# Patient Record
Sex: FEMALE | Race: WHITE | NOT HISPANIC OR LATINO | Employment: STUDENT | URBAN - METROPOLITAN AREA
[De-identification: names, ages, dates, MRNs, and addresses within clinical notes are randomized per-mention and may not be internally consistent; named-entity substitution may affect disease eponyms.]

---

## 2021-10-28 ENCOUNTER — HOSPITAL ENCOUNTER (EMERGENCY)
Facility: CLINIC | Age: 21
Discharge: HOME | End: 2021-10-28
Attending: EMERGENCY MEDICINE | Admitting: EMERGENCY MEDICINE
Payer: COMMERCIAL

## 2021-10-28 VITALS
DIASTOLIC BLOOD PRESSURE: 79 MMHG | SYSTOLIC BLOOD PRESSURE: 113 MMHG | RESPIRATION RATE: 16 BRPM | TEMPERATURE: 99.6 F | OXYGEN SATURATION: 99 % | HEART RATE: 80 BPM

## 2021-10-28 DIAGNOSIS — F41.0 ANXIETY ATTACK: ICD-10-CM

## 2021-10-28 DIAGNOSIS — F41.1 GENERALIZED ANXIETY DISORDER: ICD-10-CM

## 2021-10-28 PROCEDURE — 99284 EMERGENCY DEPT VISIT MOD MDM: CPT | Performed by: EMERGENCY MEDICINE

## 2021-10-28 PROCEDURE — 99285 EMERGENCY DEPT VISIT HI MDM: CPT | Mod: 25 | Performed by: EMERGENCY MEDICINE

## 2021-10-28 PROCEDURE — 90791 PSYCH DIAGNOSTIC EVALUATION: CPT

## 2021-10-28 RX ORDER — SERTRALINE HYDROCHLORIDE 100 MG/1
100 TABLET, FILM COATED ORAL DAILY
COMMUNITY

## 2021-10-28 RX ORDER — MULTIPLE VITAMINS W/ MINERALS TAB 9MG-400MCG
1 TAB ORAL DAILY
COMMUNITY

## 2021-10-28 RX ORDER — HYDROXYZINE HYDROCHLORIDE 25 MG/1
25 TABLET, FILM COATED ORAL 3 TIMES DAILY PRN
COMMUNITY

## 2021-10-28 RX ORDER — ACETAMINOPHEN 500 MG
500-1000 TABLET ORAL EVERY 6 HOURS PRN
COMMUNITY

## 2021-10-28 ASSESSMENT — ENCOUNTER SYMPTOMS
CHILLS: 0
ARTHRALGIAS: 0
SHORTNESS OF BREATH: 0
NERVOUS/ANXIOUS: 1
COUGH: 0
LIGHT-HEADEDNESS: 0
FEVER: 0
HEADACHES: 0
EYE REDNESS: 0
COLOR CHANGE: 0
NECK STIFFNESS: 0
DIFFICULTY URINATING: 0
CONFUSION: 0
VOMITING: 0
SLEEP DISTURBANCE: 0
ABDOMINAL PAIN: 0
NAUSEA: 0

## 2021-10-28 NOTE — ED NOTES
If I am feeling unsafe or I am in a crisis, I will:   Contact my established care providers   Call the National Suicide Prevention Lifeline: 121.530.5565   Go to the nearest emergency room   Call 435        Warning signs that I or other people might notice when a crisis is developing for me:   I start to experience an increase in physical symptoms such as body tingling/numbness, fidgeting, picking my nails, cracking my knuckles, and nausea.    Things I am able to do on my own to cope or help me feel better:   Focus on school and utilize [employer]    Things that I am able to do with others to cope or help me better:   Spend time with roommates, connect with friends, reach out to family    Things I can use or do for distraction:   Work and school    People in my life that I can ask for help:   Therapist, mentor, family    Your [facility] has a mental health crisis team you can call 24/7:   [facility] [phone]

## 2021-10-28 NOTE — ED NOTES
Bed: HW01  Expected date: 10/28/21  Expected time: 3:42 PM  Means of arrival: Ambulance  Comments:  Hen 449  20 F anxiety

## 2021-10-28 NOTE — ED NOTES
I performed medical screening exam and found patient to be stable for mental health evaluation.  It appears the patient panic attack while driving to her occupational therapy appointment.  She felt lightheaded when she arrived at the appointment, hyperventilating, and had a feeling that she might lose consciousness.  She sat down and within several minutes her symptoms improved.  She has history of panic attacks.  At this time she stable for evaluation by [facility] Team.     Rachael Corrales MD  10/28/21 1615

## 2021-10-28 NOTE — ED PROVIDER NOTES
[facility] EMERGENCY DEPARTMENT ([facility])    10/28/21    Hallway 1  History     Chief Complaint   Patient presents with     Anxiety     Per pt report, experienced severe panic attack and passed out while at [employer]. Says she has not experienced one this bad before.      The history is provided by the patient.     Kourtney Santiago is a 20 year old female who presents for evaluation after panic attack.  She was driving to her occupational therapy appointment when she started to feel lightheaded, hyperventilating.  When she arrived to her appointment she had concern that she was going to lose consciousness, sat down and her symptoms did not improve with this.  She was sent to the ED for further evaluation.  Here she feels improved and notes prior history of panic attacks.    Patient was seen by [facility] .  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  Patient was having panic attack-like symptoms on arrival to the ED she was hyperventilating and she stated that her body was numb.  Patient's parents are missionaries and she has been moving around a lot the past few years and since starting school here in [place] she has been feeling lost and anxious.  She states that she misses her family and friends.  Patient does note that she really enjoys her schooling here.  She states she has some roommates that she is trying to make a connection with.  Patient has a therapist at her school and recently started seeing another therapist in an outpatient setting.  Patient states she has been focusing on CBT therapy with her new therapist.  Patient denies SI or HI and has no history of SI or HI.  Patient is taking Zoloft and hydroxyzine and states that she feels like she needs an increase in her dosage which has been previously discussed with her PCP.    Past Medical History  No past medical history on file.  No past surgical history on file.  acetaminophen (TYLENOL) 500 MG tablet  hydrOXYzine  (ATARAX) 25 MG tablet  multivitamin w/minerals (THERA-VIT-M) tablet  sertraline (ZOLOFT) 100 MG tablet      No Known Allergies  Family History  No family history on file.  Social History   Social History     Tobacco Use     Smoking status: Not on file   Substance Use Topics     Alcohol use: Not on file     Drug use: Not on file      Past medical history, past surgical history, medications, allergies, family history, and social history were reviewed with the patient. No additional pertinent items.       Review of Systems   Constitutional: Negative for chills and fever.   HENT: Negative for congestion.    Eyes: Negative for redness.   Respiratory: Negative for cough and shortness of breath.    Cardiovascular: Negative for chest pain.   Gastrointestinal: Negative for abdominal pain, nausea and vomiting.   Genitourinary: Negative for difficulty urinating.   Musculoskeletal: Negative for arthralgias and neck stiffness.   Skin: Negative for color change.   Neurological: Negative for light-headedness (Improved) and headaches.   Psychiatric/Behavioral: Negative for confusion, sleep disturbance and suicidal ideas. The patient is nervous/anxious (Improved occasional).    All other systems reviewed and are negative.    A complete review of systems was performed with pertinent positives and negatives noted in the HPI, and all other systems negative.    Physical Exam   BP: 130/85  Pulse: 99  Temp: 98.9  F (37.2  C)  Resp: 16  SpO2: 99 %  Physical Exam  Vitals and nursing note reviewed.   Constitutional:       General: She is not in acute distress.     Appearance: Normal appearance. She is not ill-appearing, toxic-appearing or diaphoretic.      Comments: Sitting in bed, awake, alert, appears in no acute distress.  She is well-groomed and appears her stated age.   HENT:      Head: Normocephalic and atraumatic.      Mouth/Throat:      Pharynx: No oropharyngeal exudate.   Eyes:      General: No scleral icterus.     Extraocular  Movements: Extraocular movements intact.      Conjunctiva/sclera: Conjunctivae normal.   Cardiovascular:      Rate and Rhythm: Normal rate.      Pulses: Normal pulses.      Heart sounds: Normal heart sounds.   Pulmonary:      Effort: Pulmonary effort is normal. No respiratory distress.      Breath sounds: Normal breath sounds.   Abdominal:      Palpations: Abdomen is soft.      Tenderness: There is no abdominal tenderness. There is no guarding or rebound.   Musculoskeletal:         General: No tenderness. Normal range of motion.      Cervical back: Normal range of motion and neck supple.      Right lower leg: No edema.      Left lower leg: No edema.   Skin:     General: Skin is warm.      Findings: No rash.   Neurological:      General: No focal deficit present.      Mental Status: She is alert and oriented to person, place, and time.      Cranial Nerves: No cranial nerve deficit.      Coordination: Coordination normal.   Psychiatric:         Mood and Affect: Mood normal.         Behavior: Behavior normal.         Thought Content: Thought content normal.         Judgment: Judgment normal.      Comments: No suicidal homicidal ideations.  No signs of active hallucinations.  Linear thought process.         ED Course      Procedures            No results found for any visits on 10/28/21.  Medications - No data to display     Assessments & Plan (with Medical Decision Making)   20-year-old woman brought in by ambulance from her therapist office due to anxiety and lightheadedness.  Patient symptoms have resolved by the time she arrived to the emergency department.  Symptoms are likely due to an anxiety attack given patient's history.  She has no risk factors for ACS or pulmonary embolus.  She was seen and evaluated by mental health  and deemed appropriate for discharge with outpatient follow-up with her primary care physician and her therapist.  She agrees with this plan.  She is safe and stable for discharge.  She  has no suicidal homicidal ideations.    I have reviewed the nursing notes. I have reviewed the findings, diagnosis, plan and need for follow up with the patient.    Discharge Medication List as of 10/28/2021  6:22 PM          Final diagnoses:   Anxiety attack   Generalized anxiety disorder       --  ILouisa, am serving as a trained medical scribe to document services personally performed by Rachael Corrales MD, MD, based on the provider's statements to me.     Savanna HAYES Nikola, MD, MD, was physically present and have reviewed and verified the accuracy of this note documented by Louisa Pickering.    Rachael Corrales MD  [facility] EMERGENCY DEPARTMENT  10/28/2021     Rachael Corrales MD  10/28/21 4610

## 2021-10-28 NOTE — DISCHARGE INSTRUCTIONS
Discharge plan:    Follow up with Primary Care Dr. Nancy Amor at [address] , [place] [address] for medication management.    Continue with outpatient mental health therapy at   [facility]: appointment scheduled for Monday November 1, 2021    [facility] on Wednesday November 3, 2021        Safety Plan:    If I am feeling unsafe or I am in a crisis, I will:   Contact my established care providers   Call the National Suicide Prevention Lifeline: [phone]   Go to the nearest emergency room   Call [phone]        Warning signs that I or other people might notice when a crisis is developing for me:   I start to experience an increase in physical symptoms such as body tingling/numbness, fidgeting, picking my nails, cracking my knuckles, and nausea.    Things I am able to do on my own to cope or help me feel better:   Focus on school and utilize NPR    Things that I am able to do with others to cope or help me better:   Spend time with roommates, connect with friends, reach out to family    Things I can use or do for distraction:   Work and school    People in my life that I can ask for help:   Therapist, mentor, family    Your [facility] has a mental health crisis team you can call 24/7:   [facility] [phone]

## 2021-10-28 NOTE — Clinical Note
Jack was seen and treated in our emergency department on 10/28/2021.  She may return to school on 10/29/2021.      If you have any questions or concerns, please don't hesitate to call.      Nicolasa Chew RN
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
## 2023-04-15 PROCEDURE — 99285 EMERGENCY DEPT VISIT HI MDM: CPT | Mod: 25 | Performed by: EMERGENCY MEDICINE

## 2023-04-15 PROCEDURE — 99284 EMERGENCY DEPT VISIT MOD MDM: CPT | Performed by: EMERGENCY MEDICINE

## 2023-04-15 PROCEDURE — 96374 THER/PROPH/DIAG INJ IV PUSH: CPT | Performed by: EMERGENCY MEDICINE

## 2023-04-16 ENCOUNTER — HOSPITAL ENCOUNTER (EMERGENCY)
Facility: CLINIC | Age: 23
Discharge: HOME | End: 2023-04-16
Attending: EMERGENCY MEDICINE | Admitting: EMERGENCY MEDICINE
Payer: COMMERCIAL

## 2023-04-16 VITALS
OXYGEN SATURATION: 100 % | DIASTOLIC BLOOD PRESSURE: 97 MMHG | BODY MASS INDEX: 21.26 KG/M2 | WEIGHT: 120 LBS | HEIGHT: 63 IN | RESPIRATION RATE: 18 BRPM | SYSTOLIC BLOOD PRESSURE: 121 MMHG | TEMPERATURE: 98.4 F | HEART RATE: 79 BPM

## 2023-04-16 DIAGNOSIS — F06.1 CATATONIC REACTION: ICD-10-CM

## 2023-04-16 LAB
ANION GAP SERPL CALCULATED.3IONS-SCNC: 15 MMOL/L (ref 7–15)
BASOPHILS # BLD AUTO: 0.1 10E3/UL (ref 0–0.2)
BASOPHILS NFR BLD AUTO: 1 %
BUN SERPL-MCNC: 7.7 MG/DL (ref 6–20)
CALCIUM SERPL-MCNC: 9.2 MG/DL (ref 8.6–10)
CHLORIDE SERPL-SCNC: 100 MMOL/L (ref 98–107)
CREAT SERPL-MCNC: 0.57 MG/DL (ref 0.51–0.95)
DEPRECATED HCO3 PLAS-SCNC: 22 MMOL/L (ref 22–29)
EOSINOPHIL # BLD AUTO: 0.2 10E3/UL (ref 0–0.7)
EOSINOPHIL NFR BLD AUTO: 2 %
ERYTHROCYTE [DISTWIDTH] IN BLOOD BY AUTOMATED COUNT: 12.1 % (ref 10–15)
GFR SERPL CREATININE-BSD FRML MDRD: >90 ML/MIN/1.73M2
GLUCOSE SERPL-MCNC: 98 MG/DL (ref 70–99)
HCG SERPL QL: NEGATIVE
HCT VFR BLD AUTO: 40.1 % (ref 35–47)
HGB BLD-MCNC: 13.1 G/DL (ref 11.7–15.7)
IMM GRANULOCYTES # BLD: 0 10E3/UL
IMM GRANULOCYTES NFR BLD: 0 %
LYMPHOCYTES # BLD AUTO: 3.2 10E3/UL (ref 0.8–5.3)
LYMPHOCYTES NFR BLD AUTO: 47 %
MAGNESIUM SERPL-MCNC: 1.9 MG/DL (ref 1.7–2.3)
MCH RBC QN AUTO: 29.3 PG (ref 26.5–33)
MCHC RBC AUTO-ENTMCNC: 32.7 G/DL (ref 31.5–36.5)
MCV RBC AUTO: 90 FL (ref 78–100)
MONOCYTES # BLD AUTO: 0.5 10E3/UL (ref 0–1.3)
MONOCYTES NFR BLD AUTO: 8 %
NEUTROPHILS # BLD AUTO: 2.9 10E3/UL (ref 1.6–8.3)
NEUTROPHILS NFR BLD AUTO: 42 %
NRBC # BLD AUTO: 0 10E3/UL
NRBC BLD AUTO-RTO: 0 /100
PHOSPHATE SERPL-MCNC: 3.9 MG/DL (ref 2.5–4.5)
PLATELET # BLD AUTO: 334 10E3/UL (ref 150–450)
POTASSIUM SERPL-SCNC: 4.5 MMOL/L (ref 3.4–5.3)
RBC # BLD AUTO: 4.47 10E6/UL (ref 3.8–5.2)
SODIUM SERPL-SCNC: 137 MMOL/L (ref 136–145)
TSH SERPL DL<=0.005 MIU/L-ACNC: 2.38 UIU/ML (ref 0.3–4.2)
WBC # BLD AUTO: 6.8 10E3/UL (ref 4–11)

## 2023-04-16 PROCEDURE — 84703 CHORIONIC GONADOTROPIN ASSAY: CPT | Performed by: EMERGENCY MEDICINE

## 2023-04-16 PROCEDURE — 84100 ASSAY OF PHOSPHORUS: CPT | Performed by: EMERGENCY MEDICINE

## 2023-04-16 PROCEDURE — 84443 ASSAY THYROID STIM HORMONE: CPT | Performed by: EMERGENCY MEDICINE

## 2023-04-16 PROCEDURE — 80048 BASIC METABOLIC PNL TOTAL CA: CPT | Performed by: EMERGENCY MEDICINE

## 2023-04-16 PROCEDURE — 83735 ASSAY OF MAGNESIUM: CPT | Performed by: EMERGENCY MEDICINE

## 2023-04-16 PROCEDURE — 36415 COLL VENOUS BLD VENIPUNCTURE: CPT | Performed by: EMERGENCY MEDICINE

## 2023-04-16 PROCEDURE — 85025 COMPLETE CBC W/AUTO DIFF WBC: CPT | Performed by: EMERGENCY MEDICINE

## 2023-04-16 PROCEDURE — 250N000011 HC RX IP 250 OP 636: Performed by: EMERGENCY MEDICINE

## 2023-04-16 RX ORDER — LORAZEPAM 2 MG/ML
0.5 INJECTION INTRAMUSCULAR ONCE
Status: COMPLETED | OUTPATIENT
Start: 2023-04-16 | End: 2023-04-16

## 2023-04-16 RX ADMIN — LORAZEPAM 0.5 MG: 2 INJECTION INTRAMUSCULAR; INTRAVENOUS at 01:59

## 2023-04-16 ASSESSMENT — ACTIVITIES OF DAILY LIVING (ADL)
ADLS_ACUITY_SCORE: 35
ADLS_ACUITY_SCORE: 35
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
## 2023-04-16 NOTE — DISCHARGE INSTRUCTIONS
Be sure to follow-up with your psychiatrist as soon as possible.  Use your hydroxyzine as needed for agitation/anxiety.  Continue taking your other medications as previously prescribed.  Return to the ED with any new or worsening symptoms.